# Patient Record
Sex: FEMALE | Race: WHITE | NOT HISPANIC OR LATINO | Employment: UNEMPLOYED | URBAN - METROPOLITAN AREA
[De-identification: names, ages, dates, MRNs, and addresses within clinical notes are randomized per-mention and may not be internally consistent; named-entity substitution may affect disease eponyms.]

---

## 2023-12-29 NOTE — PROGRESS NOTES
Subjective:     Encounter Date:01/02/2024    Primary Care Physician: Samuel Wei MD      Patient ID: Alayna Hannon is a 83 y.o. female.  Retired pediatric nurse.    Chief Complaint:Consult, Atrial Fibrillation, Aortic Stenosis, and Congestive Heart Failure    PROBLEM LIST:  Atrial fibrillation, paroxysmal  Previous ECVs  Ablation 4/2015  Loop recorder 2018  5/2023 echo EF 65%.  Mild to moderate aortic stenosis.  3/2022 normal MPS  Watchman implant  Hypertension  Aortic stenosis  Dyslipidemia  Chronic gastritis  GERD  History of melanoma with removal  Seborrheic keratosis  Surgeries:  Mohs procedure  Appendectomy  Partial hysterectomy  Breast reduction  Vitrectomy      Allergies   Allergen Reactions    Iodine Unknown - Low Severity    Sulfanilamide Unknown - Low Severity    Wound Dressing Adhesive Unknown - Low Severity         Current Outpatient Medications:     acetaminophen (TYLENOL) 650 MG 8 hr tablet, Take 1 tablet by mouth Every 8 (Eight) Hours As Needed for Mild Pain., Disp: , Rfl:     amLODIPine (NORVASC) 2.5 MG tablet, Take 1 tablet by mouth Daily., Disp: , Rfl:     aspirin 325 MG tablet, Take 1 tablet by mouth Daily., Disp: , Rfl:     atorvastatin (LIPITOR) 80 MG tablet, Take 1 tablet by mouth Every Night., Disp: , Rfl:     Cyanocobalamin (VITAMIN B 12 PO), Take 1 tablet by mouth Daily., Disp: , Rfl:     dicyclomine (BENTYL) 10 MG capsule, Take 1 capsule by mouth 4 (Four) Times a Day Before Meals & at Bedtime., Disp: , Rfl:     docusate sodium (COLACE) 250 MG capsule, Take 1 capsule by mouth Daily., Disp: , Rfl:     dofetilide (TIKOSYN) 250 MCG capsule, Take 1 capsule by mouth 2 (Two) Times a Day., Disp: , Rfl:     furosemide (LASIX) 20 MG tablet, Take 1 tablet by mouth Daily As Needed (edema)., Disp: , Rfl:     loratadine (Claritin) 10 MG tablet, Take 1 tablet by mouth Daily., Disp: , Rfl:     LORazepam (Ativan) 1 MG tablet, Take 1 tablet by mouth every night at bedtime., Disp: , Rfl:     metoprolol  succinate XL (TOPROL-XL) 25 MG 24 hr tablet, Take 1 tablet by mouth 2 (Two) Times a Day., Disp: , Rfl:     omeprazole (priLOSEC) 40 MG capsule, Take 1 capsule by mouth Daily., Disp: , Rfl:     Probiotic Product (PROBIOTIC BLEND PO), Take  by mouth., Disp: , Rfl:         History of Present Illness    Patient is an 83-year-old  female who presents today for establishment of cardiac care secondary to noted history of paroxysmal atrial fibrillation.  Patient was diagnosed with this many years ago.  She followed with cardiology in [facility] until she relocated to the area here in July.  Patient notes that her atrial fibrillation has been well-controlled post ablation with Tikosyn.  She is not on anticoagulation as she has a watchman implant.  Patient notes she is predominantly here to establish care.  She had an echocardiogram performed earlier this year and noted that her cardiologist there recommended repeat echocardiography in about 6 months which would be now.  She denies any chest pain, pressure.  Her blood pressure typically runs systolically in the 130s to 140s range.  She admits to some occasional palpitations but feels that these are PACs/PVCs.  Overall, is doing well.    The following portions of the patient's history were reviewed and updated as appropriate: allergies, current medications, past family history, past medical history, past social history, past surgical history and problem list.    Family History   Problem Relation Age of Onset    Arrhythmia Sister        Social History     Tobacco Use    Smoking status: Former     Types: Cigarettes   Substance Use Topics    Alcohol use: Yes     Comment: Occasional    Drug use: Never         Review of Systems   Constitutional: Negative for fever and malaise/fatigue.   HENT:  Negative for nosebleeds.    Eyes:  Negative for redness and visual disturbance.   Cardiovascular:  Positive for palpitations. Negative for orthopnea and paroxysmal nocturnal  "dyspnea.   Respiratory:  Negative for cough, snoring, sputum production and wheezing.    Hematologic/Lymphatic: Negative for bleeding problem.   Skin:  Negative for flushing, itching and rash.   Musculoskeletal:  Positive for arthritis. Negative for falls, joint pain and muscle cramps.   Gastrointestinal:  Negative for abdominal pain, diarrhea, heartburn, nausea and vomiting.   Genitourinary:  Negative for hematuria.   Neurological:  Negative for excessive daytime sleepiness, dizziness, headaches, tremors and weakness.   Psychiatric/Behavioral:  Negative for substance abuse. The patient is not nervous/anxious.           Objective:   /77   Pulse 67   Resp 20   Ht 175.3 cm (69\")   Wt 72.7 kg (160 lb 3.2 oz)   SpO2 100%   BMI 23.66 kg/m²         Vitals reviewed.   Constitutional:       Appearance: Healthy appearance. Well-developed and not in distress.   Eyes:      Conjunctiva/sclera: Conjunctivae normal.      Pupils: Pupils are equal, round, and reactive to light.   HENT:      Head: Normocephalic and atraumatic.    Mouth/Throat:      Pharynx: Oropharynx is clear.   Neck:      Thyroid: Thyroid normal. No thyromegaly.      Vascular: Normal carotid pulses. No carotid bruit or JVD. JVD normal.      Lymphadenopathy: No cervical adenopathy.   Pulmonary:      Effort: No respiratory distress.      Breath sounds: No wheezing. No rales.   Chest:      Chest wall: Not tender to palpatation.   Cardiovascular:      Normal rate. Regular rhythm.      Murmurs: There is a grade 3/6 systolic murmur at the URSB, ULSB and apex, radiating to the neck.      No gallop.    Pulses:     Carotid: 2+ bilaterally.     Dorsalis pedis: 2+ bilaterally.     Posterior tibial: 2+ bilaterally.  Edema:     Peripheral edema absent.   Abdominal:      General: There is no distension or abdominal bruit.      Palpations: There is no abdominal mass.      Tenderness: There is no abdominal tenderness. There is no rebound.   Musculoskeletal:         " General: No tenderness or deformity.      Extremities: No clubbing present.Skin:     General: Skin is warm and dry. There is no cyanosis.      Findings: No rash.   Neurological:      Mental Status: Alert, oriented to person, place, and time and oriented to person, place and time.           ECG 12 Lead    Date/Time: 1/2/2024 10:22 AM  Performed by: Gio Soares MD    Authorized by: Gio Soares MD  Comparison: not compared with previous ECG   Previous ECG: no previous ECG available  Rhythm: sinus rhythm and sinus arrhythmia                Assessment:   Assessment & Plan      Diagnoses and all orders for this visit:    1. Paroxysmal atrial fibrillation (Primary)  -     ECG 12 Lead      1.  Paroxysmal atrial fibrillation.  Maintaining sinus rhythm on Tikosyn.  Normal QTc today.  No anticoagulation but status post Watchman device.  2.  Hypertension controlled  3.  Aortic stenosis, moderate by most recent echo and exam today.  No definitive symptoms.  4.  Mild dyspnea on exertion, chronic and minimally changed.    Recommendations:  1.  Continue current medical therapy including Tikosyn  2.  Check echocardiogram prior to next visit  3.  Revisit 6 months apparent symptom change       Gaye GONZÁLES scribed portions of this dictation for Dr. Gio Soares.     Advance Care Planning   ACP discussion was held with the patient during this visit. Patient has an advance directive (not in EMR), copy requested.      Gio Soares MD    Dictated utilizing Dragon dictation

## 2024-01-02 ENCOUNTER — OFFICE VISIT (OUTPATIENT)
Dept: CARDIOLOGY | Facility: CLINIC | Age: 84
End: 2024-01-02
Payer: MEDICARE

## 2024-01-02 VITALS
OXYGEN SATURATION: 100 % | HEIGHT: 69 IN | DIASTOLIC BLOOD PRESSURE: 77 MMHG | HEART RATE: 67 BPM | RESPIRATION RATE: 20 BRPM | SYSTOLIC BLOOD PRESSURE: 147 MMHG | BODY MASS INDEX: 23.73 KG/M2 | WEIGHT: 160.2 LBS

## 2024-01-02 DIAGNOSIS — I48.0 PAROXYSMAL ATRIAL FIBRILLATION: Primary | ICD-10-CM

## 2024-01-02 PROCEDURE — 1160F RVW MEDS BY RX/DR IN RCRD: CPT | Performed by: INTERNAL MEDICINE

## 2024-01-02 PROCEDURE — 1159F MED LIST DOCD IN RCRD: CPT | Performed by: INTERNAL MEDICINE

## 2024-01-02 PROCEDURE — 99204 OFFICE O/P NEW MOD 45 MIN: CPT | Performed by: INTERNAL MEDICINE

## 2024-01-02 PROCEDURE — 93000 ELECTROCARDIOGRAM COMPLETE: CPT | Performed by: INTERNAL MEDICINE

## 2024-01-02 RX ORDER — DOCUSATE SODIUM 250 MG
250 CAPSULE ORAL DAILY
COMMUNITY

## 2024-01-02 RX ORDER — LORAZEPAM 1 MG/1
1 TABLET ORAL
COMMUNITY

## 2024-01-02 RX ORDER — ASPIRIN 325 MG
325 TABLET ORAL DAILY
COMMUNITY

## 2024-01-02 RX ORDER — METOPROLOL SUCCINATE 25 MG/1
25 TABLET, EXTENDED RELEASE ORAL 2 TIMES DAILY
COMMUNITY
Start: 2023-11-05

## 2024-01-02 RX ORDER — FUROSEMIDE 20 MG/1
20 TABLET ORAL DAILY PRN
COMMUNITY

## 2024-01-02 RX ORDER — OMEPRAZOLE 40 MG/1
40 CAPSULE, DELAYED RELEASE ORAL DAILY
COMMUNITY
Start: 2023-11-05

## 2024-01-02 RX ORDER — SENNOSIDES 8.6 MG
650 CAPSULE ORAL EVERY 8 HOURS PRN
COMMUNITY

## 2024-01-02 RX ORDER — AMLODIPINE BESYLATE 2.5 MG/1
2.5 TABLET ORAL DAILY
COMMUNITY
Start: 2023-11-05

## 2024-01-02 RX ORDER — DOFETILIDE 0.25 MG/1
250 CAPSULE ORAL 2 TIMES DAILY
COMMUNITY

## 2024-01-02 RX ORDER — LORATADINE 10 MG/1
10 TABLET ORAL DAILY
COMMUNITY

## 2024-01-02 RX ORDER — DICYCLOMINE HYDROCHLORIDE 10 MG/1
10 CAPSULE ORAL
COMMUNITY

## 2024-01-02 RX ORDER — ATORVASTATIN CALCIUM 80 MG/1
80 TABLET, FILM COATED ORAL NIGHTLY
COMMUNITY
Start: 2023-11-05

## 2024-07-01 NOTE — PROGRESS NOTES
Subjective:     Encounter Date:07/02/2024    Primary Care Physician: Samuel Wei MD      Patient ID: Alayna Hannon is a 84 y.o. female.    Chief Complaint:Follow-up (Had a echo this morning)    KAYLEE LIST:  Atrial fibrillation, paroxysmal  Previous ECVs  Ablation 4/2015  Loop recorder 2018 5/2023 echo EF 65%.  Mild to moderate aortic stenosis.  3/2022 normal MPS  Watchman implant  7/2/2024 echo EF 70%.  LVH noted.  Moderate AS.  Peak gradient 38 mmHg.  Mean gradient 21 mmHg.  Mild MR.  Hypertension  Aortic stenosis  Dyslipidemia  Chronic gastritis  GERD  History of melanoma with removal  Seborrheic keratosis  Surgeries:  Mohs procedure  Appendectomy  Partial hysterectomy  Breast reduction  Vitrectomy      Allergies   Allergen Reactions    Iodine Unknown - Low Severity    Sulfanilamide Unknown - Low Severity    Wound Dressing Adhesive Unknown - Low Severity         Current Outpatient Medications:     acetaminophen (TYLENOL) 650 MG 8 hr tablet, Take 1 tablet by mouth Every 8 (Eight) Hours As Needed for Mild Pain., Disp: , Rfl:     amLODIPine (NORVASC) 2.5 MG tablet, Take 1 tablet by mouth Daily., Disp: , Rfl:     aspirin 325 MG tablet, Take 1 tablet by mouth Daily., Disp: , Rfl:     atorvastatin (LIPITOR) 80 MG tablet, Take 1 tablet by mouth Every Night., Disp: , Rfl:     Cyanocobalamin (VITAMIN B 12 PO), Take 1 tablet by mouth Daily., Disp: , Rfl:     docusate sodium (COLACE) 250 MG capsule, Take 1 capsule by mouth Daily., Disp: , Rfl:     dofetilide (TIKOSYN) 250 MCG capsule, Take 1 capsule by mouth 2 (Two) Times a Day., Disp: , Rfl:     furosemide (LASIX) 20 MG tablet, Take 1 tablet by mouth Daily As Needed (edema)., Disp: , Rfl:     loratadine (Claritin) 10 MG tablet, Take 1 tablet by mouth Daily., Disp: , Rfl:     LORazepam (Ativan) 1 MG tablet, Take 1 tablet by mouth every night at bedtime., Disp: , Rfl:     metoprolol succinate XL (TOPROL-XL) 25 MG 24 hr tablet, Take 1 tablet by mouth 2 (Two) Times  "a Day., Disp: , Rfl:     omeprazole (priLOSEC) 40 MG capsule, Take 1 capsule by mouth Daily., Disp: , Rfl:     Probiotic Product (PROBIOTIC BLEND PO), Take  by mouth., Disp: , Rfl:     dicyclomine (BENTYL) 10 MG capsule, Take 1 capsule by mouth 4 (Four) Times a Day Before Meals & at Bedtime., Disp: , Rfl:         History of Present Illness    Patient is an 84-year-old  female who is being seen today for 6-month follow-up of paroxysmal atrial fibrillation and aortic stenosis patient had echocardiogram today which showed still moderate aortic stenosis.  Patient denies any chest pain, pressure.  Does endorse some shortness of breath at times but does not feel that this is lifestyle limiting.  She had a CT scan performed of her chest recently which suggested coronary calcifications.  She has questions regarding this.  She also questions whether or not her aspirin could be affecting her gastritis.  Follows blood pressure at home with controlled readings.    The following portions of the patient's history were reviewed and updated as appropriate: allergies, current medications, past family history, past medical history, past social history, past surgical history and problem list.      Social History     Tobacco Use    Smoking status: Former     Types: Cigarettes   Substance Use Topics    Alcohol use: Yes     Comment: Occasional    Drug use: Never         ROS       Objective:   /82   Pulse 62   Ht 175.3 cm (69\")   Wt 74.3 kg (163 lb 12.8 oz)   SpO2 96%   BMI 24.19 kg/m²         Vitals reviewed.   Constitutional:       Appearance: Well-developed and not in distress.   Neck:      Vascular: No JVD.      Trachea: No tracheal deviation.   Pulmonary:      Effort: Pulmonary effort is normal.      Breath sounds: Normal breath sounds.   Cardiovascular:      Normal rate. Regular rhythm.      Murmurs: There is a grade 3/6 systolic murmur.   Pulses:     Intact distal pulses.   Edema:     Peripheral edema absent. "   Musculoskeletal:         General: No deformity. Skin:     General: Skin is warm and dry.   Neurological:      Mental Status: Alert and oriented to person, place, and time.         Procedures          Assessment:   Assessment & Plan      Diagnoses and all orders for this visit:    1. Aortic stenosis, moderate (Primary), stable.  No evidence of heart failure.  Echocardiogram reviewed with patient in the office today.  She verbalized understanding.    2. Paroxysmal atrial fibrillation, stable.  Recent issues with tachypalpitations.  This has been helped with changing beta-blockade dosage time.  Previous Watchman.  On aspirin.    3. Dyslipidemia, stable.  Labs with primary care.  On statin.    4. Coronary artery disease involving native coronary artery of native heart without angina pectoris.  Recent CT scan with multivessel coronary calcifications.  No overt angina.  Normal myocardial perfusion study in 2022.      Plan:  Patient is overall stable from cardiac standpoint.  Decrease aspirin to 81 mg daily to see if this helps with some of her gastritis.  Continue other current cardiac medications.  Discussed the possibility of myocardial perfusion study given multivessel coronary calcifications.  At this time patient does not feel that her symptoms warrant this.  She will call us back if this changes.  Follow-up in 6 months time with repeat echo or sooner if needed.                 Dictated utilizing Dragon dictation

## 2024-07-02 ENCOUNTER — HOSPITAL ENCOUNTER (OUTPATIENT)
Dept: CARDIOLOGY | Facility: HOSPITAL | Age: 84
Discharge: HOME | End: 2024-07-02
Admitting: INTERNAL MEDICINE
Payer: MEDICARE

## 2024-07-02 ENCOUNTER — OFFICE VISIT (OUTPATIENT)
Dept: CARDIOLOGY | Facility: CLINIC | Age: 84
End: 2024-07-02
Payer: MEDICARE

## 2024-07-02 VITALS — WEIGHT: 160 LBS | HEIGHT: 69 IN | BODY MASS INDEX: 23.7 KG/M2

## 2024-07-02 VITALS
HEIGHT: 69 IN | BODY MASS INDEX: 24.26 KG/M2 | HEART RATE: 62 BPM | DIASTOLIC BLOOD PRESSURE: 82 MMHG | SYSTOLIC BLOOD PRESSURE: 159 MMHG | WEIGHT: 163.8 LBS | OXYGEN SATURATION: 96 %

## 2024-07-02 DIAGNOSIS — I48.0 PAROXYSMAL ATRIAL FIBRILLATION: ICD-10-CM

## 2024-07-02 DIAGNOSIS — I25.10 CORONARY ARTERY DISEASE INVOLVING NATIVE CORONARY ARTERY OF NATIVE HEART WITHOUT ANGINA PECTORIS: ICD-10-CM

## 2024-07-02 DIAGNOSIS — I35.0 AORTIC STENOSIS, MODERATE: Primary | ICD-10-CM

## 2024-07-02 DIAGNOSIS — E78.5 DYSLIPIDEMIA: ICD-10-CM

## 2024-07-02 LAB
ASCENDING AORTA: 3 CM
BH CV ECHO MEAS - AO MAX PG: 38 MMHG
BH CV ECHO MEAS - AO MEAN PG: 21 MMHG
BH CV ECHO MEAS - AO ROOT DIAM: 2.9 CM
BH CV ECHO MEAS - AO V2 MAX: 306 CM/SEC
BH CV ECHO MEAS - AO V2 VTI: 82.9 CM
BH CV ECHO MEAS - AVA(I,D): 0.8 CM2
BH CV ECHO MEAS - EDV(CUBED): 68 ML
BH CV ECHO MEAS - EDV(MOD-SP2): 58.3 ML
BH CV ECHO MEAS - EDV(MOD-SP4): 60.6 ML
BH CV ECHO MEAS - EF(MOD-BP): 84.5 %
BH CV ECHO MEAS - EF(MOD-SP2): 81.6 %
BH CV ECHO MEAS - EF(MOD-SP4): 85.7 %
BH CV ECHO MEAS - ESV(CUBED): 14.7 ML
BH CV ECHO MEAS - ESV(MOD-SP2): 10.7 ML
BH CV ECHO MEAS - ESV(MOD-SP4): 8.6 ML
BH CV ECHO MEAS - FS: 40 %
BH CV ECHO MEAS - IVS/LVPW: 0.69 CM
BH CV ECHO MEAS - IVSD: 0.86 CM
BH CV ECHO MEAS - LA DIMENSION: 4.1 CM
BH CV ECHO MEAS - LAT PEAK E' VEL: 12.4 CM/SEC
BH CV ECHO MEAS - LV DIASTOLIC VOL/BSA (35-75): 32.3 CM2
BH CV ECHO MEAS - LV MASS(C)D: 142.3 GRAMS
BH CV ECHO MEAS - LV MAX PG: 2.46 MMHG
BH CV ECHO MEAS - LV MEAN PG: 1.5 MMHG
BH CV ECHO MEAS - LV SYSTOLIC VOL/BSA (12-30): 4.6 CM2
BH CV ECHO MEAS - LV V1 MAX: 78.4 CM/SEC
BH CV ECHO MEAS - LV V1 VTI: 20.9 CM
BH CV ECHO MEAS - LVIDD: 4.1 CM
BH CV ECHO MEAS - LVIDS: 2.45 CM
BH CV ECHO MEAS - LVOT AREA: 3.3 CM2
BH CV ECHO MEAS - LVOT DIAM: 2.05 CM
BH CV ECHO MEAS - LVPWD: 1.25 CM
BH CV ECHO MEAS - MED PEAK E' VEL: 8.6 CM/SEC
BH CV ECHO MEAS - MV A MAX VEL: 39.4 CM/SEC
BH CV ECHO MEAS - MV DEC SLOPE: 534.3 CM/SEC2
BH CV ECHO MEAS - MV DEC TIME: 0.29 SEC
BH CV ECHO MEAS - MV E MAX VEL: 108 CM/SEC
BH CV ECHO MEAS - MV E/A: 2.7
BH CV ECHO MEAS - MV P1/2T: 72.6 MSEC
BH CV ECHO MEAS - MVA(P1/2T): 3 CM2
BH CV ECHO MEAS - PA ACC TIME: 0.1 SEC
BH CV ECHO MEAS - PA V2 MAX: 105.2 CM/SEC
BH CV ECHO MEAS - PAPD(PI EDV): 6 MMHG
BH CV ECHO MEAS - PI END-D VEL: 117.6 CM/SEC
BH CV ECHO MEAS - RAP SYSTOLE: 8 MMHG
BH CV ECHO MEAS - RVSP: 33 MMHG
BH CV ECHO MEAS - SV(LVOT): 69 ML
BH CV ECHO MEAS - SV(MOD-SP2): 47.6 ML
BH CV ECHO MEAS - SV(MOD-SP4): 52 ML
BH CV ECHO MEAS - SVI(LVOT): 36.7 ML/M2
BH CV ECHO MEAS - SVI(MOD-SP2): 25.3 ML/M2
BH CV ECHO MEAS - SVI(MOD-SP4): 27.7 ML/M2
BH CV ECHO MEAS - TAPSE (>1.6): 2.6 CM
BH CV ECHO MEAS - TR MAX PG: 25 MMHG
BH CV ECHO MEAS - TR MAX VEL: 249.4 CM/SEC
BH CV ECHO MEASUREMENTS AVERAGE E/E' RATIO: 10.29
BH CV VAS BP LEFT ARM: NORMAL MMHG
BH CV XLRA - RV BASE: 4.2 CM
BH CV XLRA - RV LENGTH: 7 CM
BH CV XLRA - RV MID: 3.2 CM
BH CV XLRA - TDI S': 11.7 CM/SEC
IVRT: 72 MS
LEFT ATRIUM VOLUME INDEX: 49.1 ML/M2
LV EF 2D ECHO EST: 84 %

## 2024-07-02 PROCEDURE — 1160F RVW MEDS BY RX/DR IN RCRD: CPT | Performed by: NURSE PRACTITIONER

## 2024-07-02 PROCEDURE — 99214 OFFICE O/P EST MOD 30 MIN: CPT | Performed by: NURSE PRACTITIONER

## 2024-07-02 PROCEDURE — 93306 TTE W/DOPPLER COMPLETE: CPT

## 2024-07-02 PROCEDURE — 1159F MED LIST DOCD IN RCRD: CPT | Performed by: NURSE PRACTITIONER

## 2024-07-02 NOTE — LETTER
July 2, 2024       No Recipients    Patient: Alayna Hannon   YOB: 1940   Date of Visit: 7/2/2024     Dear Samuel Wei MD:       Thank you for referring Alayna Hannon to me for evaluation. Below are the relevant portions of my assessment and plan of care.    If you have questions, please do not hesitate to call me. I look forward to following Alayna along with you.         Sincerely,        NIVIA Goyal        CC:   No Recipients    Gaye Jordan APRN  07/02/24 [id number]  Sign when Signing Visit  Subjective:     Encounter Date:07/02/2024    Primary Care Physician: Samuel Wei MD      Patient ID: Alayna Hannon is a 84 y.o. female.    Chief Complaint:Follow-up (Had a echo this morning)    CONSUELOGAL LIST:  Atrial fibrillation, paroxysmal  Previous ECVs  Ablation 4/2015  Loop recorder 2018 5/2023 echo EF 65%.  Mild to moderate aortic stenosis.  3/2022 normal MPS  Watchman implant  7/2/2024 echo EF 70%.  LVH noted.  Moderate AS.  Peak gradient 38 mmHg.  Mean gradient 21 mmHg.  Mild MR.  Hypertension  Aortic stenosis  Dyslipidemia  Chronic gastritis  GERD  History of melanoma with removal  Seborrheic keratosis  Surgeries:  Mohs procedure  Appendectomy  Partial hysterectomy  Breast reduction  Vitrectomy      Allergies   Allergen Reactions   • Iodine Unknown - Low Severity   • Sulfanilamide Unknown - Low Severity   • Wound Dressing Adhesive Unknown - Low Severity         Current Outpatient Medications:   •  acetaminophen (TYLENOL) 650 MG 8 hr tablet, Take 1 tablet by mouth Every 8 (Eight) Hours As Needed for Mild Pain., Disp: , Rfl:   •  amLODIPine (NORVASC) 2.5 MG tablet, Take 1 tablet by mouth Daily., Disp: , Rfl:   •  aspirin 325 MG tablet, Take 1 tablet by mouth Daily., Disp: , Rfl:   •  atorvastatin (LIPITOR) 80 MG tablet, Take 1 tablet by mouth Every Night., Disp: , Rfl:   •  Cyanocobalamin (VITAMIN B 12 PO), Take 1 tablet by mouth Daily., Disp: , Rfl:   •  docusate sodium (COLACE)  250 MG capsule, Take 1 capsule by mouth Daily., Disp: , Rfl:   •  dofetilide (TIKOSYN) 250 MCG capsule, Take 1 capsule by mouth 2 (Two) Times a Day., Disp: , Rfl:   •  furosemide (LASIX) 20 MG tablet, Take 1 tablet by mouth Daily As Needed (edema)., Disp: , Rfl:   •  loratadine (Claritin) 10 MG tablet, Take 1 tablet by mouth Daily., Disp: , Rfl:   •  LORazepam (Ativan) 1 MG tablet, Take 1 tablet by mouth every night at bedtime., Disp: , Rfl:   •  metoprolol succinate XL (TOPROL-XL) 25 MG 24 hr tablet, Take 1 tablet by mouth 2 (Two) Times a Day., Disp: , Rfl:   •  omeprazole (priLOSEC) 40 MG capsule, Take 1 capsule by mouth Daily., Disp: , Rfl:   •  Probiotic Product (PROBIOTIC BLEND PO), Take  by mouth., Disp: , Rfl:   •  dicyclomine (BENTYL) 10 MG capsule, Take 1 capsule by mouth 4 (Four) Times a Day Before Meals & at Bedtime., Disp: , Rfl:         History of Present Illness    Patient is an 84-year-old  female who is being seen today for 6-month follow-up of paroxysmal atrial fibrillation and aortic stenosis patient had echocardiogram today which showed still moderate aortic stenosis.  Patient denies any chest pain, pressure.  Does endorse some shortness of breath at times but does not feel that this is lifestyle limiting.  She had a CT scan performed of her chest recently which suggested coronary calcifications.  She has questions regarding this.  She also questions whether or not her aspirin could be affecting her gastritis.  Follows blood pressure at home with controlled readings.    The following portions of the patient's history were reviewed and updated as appropriate: allergies, current medications, past family history, past medical history, past social history, past surgical history and problem list.      Social History     Tobacco Use   • Smoking status: Former     Types: Cigarettes   Substance Use Topics   • Alcohol use: Yes     Comment: Occasional   • Drug use: Never         ROS      "  Objective:   /82   Pulse 62   Ht 175.3 cm (69\")   Wt 74.3 kg (163 lb 12.8 oz)   SpO2 96%   BMI 24.19 kg/m²         Vitals reviewed.   Constitutional:       Appearance: Well-developed and not in distress.   Neck:      Vascular: No JVD.      Trachea: No tracheal deviation.   Pulmonary:      Effort: Pulmonary effort is normal.      Breath sounds: Normal breath sounds.   Cardiovascular:      Normal rate. Regular rhythm.      Murmurs: There is a grade 3/6 systolic murmur.   Pulses:     Intact distal pulses.   Edema:     Peripheral edema absent.   Musculoskeletal:         General: No deformity. Skin:     General: Skin is warm and dry.   Neurological:      Mental Status: Alert and oriented to person, place, and time.         Procedures          Assessment:   Assessment & Plan     Diagnoses and all orders for this visit:    1. Aortic stenosis, moderate (Primary), stable.  No evidence of heart failure.  Echocardiogram reviewed with patient in the office today.  She verbalized understanding.    2. Paroxysmal atrial fibrillation, stable.  Recent issues with tachypalpitations.  This has been helped with changing beta-blockade dosage time.  Previous Watchman.  On aspirin.    3. Dyslipidemia, stable.  Labs with primary care.  On statin.    4. Coronary artery disease involving native coronary artery of native heart without angina pectoris.  Recent CT scan with multivessel coronary calcifications.  No overt angina.  Normal myocardial perfusion study in 2022.      Plan:  Patient is overall stable from cardiac standpoint.  Decrease aspirin to 81 mg daily to see if this helps with some of her gastritis.  Continue other current cardiac medications.  Discussed the possibility of myocardial perfusion study given multivessel coronary calcifications.  At this time patient does not feel that her symptoms warrant this.  She will call us back if this changes.  Follow-up in 6 months time with repeat echo or sooner if needed.   "               Dictated utilizing Dragon dictation

## 2025-03-11 ENCOUNTER — HOSPITAL ENCOUNTER (OUTPATIENT)
Dept: CARDIOLOGY | Facility: HOSPITAL | Age: 85
Discharge: HOME | End: 2025-03-11
Admitting: NURSE PRACTITIONER
Payer: MEDICARE

## 2025-03-11 DIAGNOSIS — I35.0 AORTIC STENOSIS, MODERATE: ICD-10-CM

## 2025-03-11 LAB
AV MEAN PRESS GRAD SYS DOP V1V2: 28 MMHG
AV VMAX SYS DOP: 345 CM/SEC
BH CV ECHO MEAS - AO MAX PG: 46 MMHG
BH CV ECHO MEAS - AO ROOT DIAM: 3 CM
BH CV ECHO MEAS - AO V2 VTI: 89.8 CM
BH CV ECHO MEAS - AVA(I,D): 0.72 CM2
BH CV ECHO MEAS - EDV(CUBED): 78.6 ML
BH CV ECHO MEAS - EDV(MOD-SP2): 114 ML
BH CV ECHO MEAS - EDV(MOD-SP4): 92 ML
BH CV ECHO MEAS - EF(MOD-SP2): 72.2 %
BH CV ECHO MEAS - EF(MOD-SP4): 59.2 %
BH CV ECHO MEAS - ESV(CUBED): 20 ML
BH CV ECHO MEAS - ESV(MOD-SP2): 31.7 ML
BH CV ECHO MEAS - ESV(MOD-SP4): 37.5 ML
BH CV ECHO MEAS - FS: 36.6 %
BH CV ECHO MEAS - IVS/LVPW: 1.04 CM
BH CV ECHO MEAS - IVSD: 1.35 CM
BH CV ECHO MEAS - LA DIMENSION: 4.1 CM
BH CV ECHO MEAS - LAT PEAK E' VEL: 10.4 CM/SEC
BH CV ECHO MEAS - LV DIASTOLIC VOL/BSA (35-75): 48.6 CM2
BH CV ECHO MEAS - LV MASS(C)D: 213.7 GRAMS
BH CV ECHO MEAS - LV MAX PG: 3.3 MMHG
BH CV ECHO MEAS - LV MEAN PG: 1.82 MMHG
BH CV ECHO MEAS - LV SYSTOLIC VOL/BSA (12-30): 19.8 CM2
BH CV ECHO MEAS - LV V1 MAX: 91.3 CM/SEC
BH CV ECHO MEAS - LV V1 VTI: 25.6 CM
BH CV ECHO MEAS - LVIDD: 4.3 CM
BH CV ECHO MEAS - LVIDS: 2.7 CM
BH CV ECHO MEAS - LVOT AREA: 2.5 CM2
BH CV ECHO MEAS - LVOT DIAM: 1.8 CM
BH CV ECHO MEAS - LVPWD: 1.31 CM
BH CV ECHO MEAS - MED PEAK E' VEL: 6.5 CM/SEC
BH CV ECHO MEAS - MV A MAX VEL: 55.3 CM/SEC
BH CV ECHO MEAS - MV DEC SLOPE: 865.7 CM/SEC2
BH CV ECHO MEAS - MV E MAX VEL: 109 CM/SEC
BH CV ECHO MEAS - MV E/A: 1.97
BH CV ECHO MEAS - MV MAX PG: 6.5 MMHG
BH CV ECHO MEAS - MV MEAN PG: 1.42 MMHG
BH CV ECHO MEAS - MV P1/2T: 44.5 MSEC
BH CV ECHO MEAS - MV V2 VTI: 32 CM
BH CV ECHO MEAS - MVA(P1/2T): 4.9 CM2
BH CV ECHO MEAS - MVA(VTI): 2.03 CM2
BH CV ECHO MEAS - PA ACC TIME: 0.12 SEC
BH CV ECHO MEAS - RAP SYSTOLE: 8 MMHG
BH CV ECHO MEAS - RVSP: 31 MMHG
BH CV ECHO MEAS - SV(LVOT): 65 ML
BH CV ECHO MEAS - SV(MOD-SP2): 82.3 ML
BH CV ECHO MEAS - SV(MOD-SP4): 54.5 ML
BH CV ECHO MEAS - SVI(LVOT): 34.3 ML/M2
BH CV ECHO MEAS - SVI(MOD-SP2): 43.5 ML/M2
BH CV ECHO MEAS - SVI(MOD-SP4): 28.8 ML/M2
BH CV ECHO MEAS - TAPSE (>1.6): 2.43 CM
BH CV ECHO MEAS - TR MAX PG: 23 MMHG
BH CV ECHO MEAS - TR MAX VEL: 238 CM/SEC
BH CV ECHO MEASUREMENTS AVERAGE E/E' RATIO: 12.9
BH CV XLRA - RV BASE: 4 CM
BH CV XLRA - RV LENGTH: 6.3 CM
BH CV XLRA - RV MID: 3.1 CM
BH CV XLRA - TDI S': 10.4 CM/SEC
LEFT ATRIUM VOLUME INDEX: 37.8 ML/M2
LV EF 2D ECHO EST: 70 %
LV EF BIPLANE MOD: 67.4 %

## 2025-03-11 PROCEDURE — 93306 TTE W/DOPPLER COMPLETE: CPT

## 2025-03-11 NOTE — PROGRESS NOTES
Subjective:     Encounter Date:03/12/2025    Primary Care Physician: Samuel Wei MD      Patient ID: Alayna Hannon is a 85 y.o. female.    Chief Complaint:Follow-up (6 month )    PROBLEM LIST:  Atrial fibrillation, paroxysmal  Previous ECVs  Ablation 4/2015  Loop recorder 2018 5/2023 echo EF 65%.  Mild to moderate aortic stenosis.  3/2022 normal MPS  Watchman implant  7/2/2024 echo EF 70%.  LVH noted.  Moderate AS.  Peak gradient 38 mmHg.  Mean gradient 21 mmHg.  Mild MR.  3/2025 echo EF 70%.  Moderate LVH.  Moderate aortic stenosis.  Mean pressure gradient 28 mmHg.  Mild MR.  Hypertension  Aortic stenosis  Dyslipidemia  Chronic gastritis  GERD  History of melanoma with removal  Seborrheic keratosis  Surgeries:  Mohs procedure  Appendectomy  Partial hysterectomy  Breast reduction  Vitrectomy      Allergies   Allergen Reactions    Iodine Unknown - Low Severity    Sulfanilamide Unknown - Low Severity    Wound Dressing Adhesive Unknown - Low Severity         Current Outpatient Medications:     acetaminophen (TYLENOL) 650 MG 8 hr tablet, Take 1 tablet by mouth Every 8 (Eight) Hours As Needed for Mild Pain., Disp: , Rfl:     amLODIPine (NORVASC) 2.5 MG tablet, Take 1 tablet by mouth Daily., Disp: , Rfl:     atorvastatin (LIPITOR) 80 MG tablet, Take 1 tablet by mouth Every Night., Disp: , Rfl:     bisoprolol (ZEBeta) 5 MG tablet, , Disp: , Rfl:     Cyanocobalamin (VITAMIN B 12 PO), Take 1 tablet by mouth Daily., Disp: , Rfl:     dicyclomine (BENTYL) 10 MG capsule, Take 1 capsule by mouth 4 (Four) Times a Day Before Meals & at Bedtime., Disp: , Rfl:     docusate sodium (COLACE) 250 MG capsule, Take 1 capsule by mouth Daily., Disp: , Rfl:     dofetilide (TIKOSYN) 250 MCG capsule, Take 1 capsule by mouth 2 (Two) Times a Day., Disp: , Rfl:     furosemide (LASIX) 20 MG tablet, Take 1 tablet by mouth Daily As Needed (edema)., Disp: , Rfl:     loratadine (Claritin) 10 MG tablet, Take 1 tablet by mouth Daily., Disp: ,  "Rfl:     LORazepam (Ativan) 1 MG tablet, Take 1 tablet by mouth every night at bedtime., Disp: , Rfl:     losartan (COZAAR) 25 MG tablet, As Needed (if systolic BP is over 140)., Disp: , Rfl:     omeprazole (priLOSEC) 40 MG capsule, Take 1 capsule by mouth Daily., Disp: , Rfl:     Probiotic Product (PROBIOTIC BLEND PO), Take  by mouth., Disp: , Rfl:     metoprolol succinate XL (TOPROL-XL) 25 MG 24 hr tablet, Take 1 tablet by mouth 2 (Two) Times a Day. (Patient not taking: Reported on 3/12/2025), Disp: , Rfl:         History of Present Illness    Patient is an 85-year-old  female who we are seeing today for 6-month follow-up of aortic stenosis and paroxysmal atrial fibrillation.  Over the course of the last couple of months she has been experiencing a diffuse rash.  She has been following with her primary care physician.  She is changed multiple things to see if this would help with her rash.  She has now briefly holding medications intermittently.  She is scheduled to see a dermatologist in April.  She has chronic shortness of breath with exertion which she does not think is overall significantly changed.  No reported syncope, presyncope.  Does have some intermittent palpitations/atrial fibrillation but these are overall very brief in nature and not bothersome.  Her primary care physician discontinued her aspirin.    The following portions of the patient's history were reviewed and updated as appropriate: allergies, current medications, past family history, past medical history, past social history, past surgical history and problem list.      Social History     Tobacco Use    Smoking status: Former     Types: Cigarettes   Substance Use Topics    Alcohol use: Yes     Comment: Occasional    Drug use: Never         ROS       Objective:   /70   Pulse 56   Ht 175.3 cm (69\")   Wt 76.3 kg (168 lb 3.2 oz)   BMI 24.84 kg/m²         Vitals reviewed.   Constitutional:       Appearance: Well-developed and " not in distress.   Neck:      Vascular: No JVD.      Trachea: No tracheal deviation.   Pulmonary:      Effort: Pulmonary effort is normal.      Breath sounds: Normal breath sounds.   Cardiovascular:      Normal rate. Regular rhythm.      Murmurs: There is a grade 3/6 systolic murmur.   Edema:     Peripheral edema absent.   Musculoskeletal:         General: No deformity. Skin:     General: Skin is warm and dry.   Neurological:      Mental Status: Alert and oriented to person, place, and time.           ECG 12 Lead    Date/Time: 3/12/2025 2:00 PM  Performed by: Gaye Jordan APRN    Authorized by: Gaye Jordan APRN  Comparison: compared with previous ECG from 1/2/2024  Similar to previous ECG  Rhythm: sinus rhythm  Comments: Acceptable QTc                Assessment:   Assessment & Plan      Diagnoses and all orders for this visit:    1. Aortic stenosis, moderate (Primary), stable.  Echocardiogram performed this week.    2. Paroxysmal atrial fibrillation, stable.  On Tikosyn.  EKG acceptable today.    3. Coronary artery disease involving native coronary artery of native heart without angina pectoris, stable dyspnea on exertion.  Patient still wishes to defer ischemic evaluation.  On statin.      Plan:  Discussed with patient given her rash she may briefly hold amlodipine to see if this helps.  Reviewed echocardiogram and EKG results with patient in the office today.  Agree with discontinuation of aspirin.  Continue other current cardiac medications.  Follow-up in 6 months time with an EKG or sooner if needed.       Gaye GONZÁLES     Advance Care Planning   ACP discussion was held with the patient during this visit. Patient has an advance directive (not in EMR), copy requested.        Dictated utilizing Dragon dictation

## 2025-03-12 ENCOUNTER — OFFICE VISIT (OUTPATIENT)
Dept: CARDIOLOGY | Facility: CLINIC | Age: 85
End: 2025-03-12
Payer: MEDICARE

## 2025-03-12 VITALS
HEART RATE: 56 BPM | HEIGHT: 69 IN | WEIGHT: 168.2 LBS | BODY MASS INDEX: 24.91 KG/M2 | DIASTOLIC BLOOD PRESSURE: 70 MMHG | SYSTOLIC BLOOD PRESSURE: 162 MMHG

## 2025-03-12 DIAGNOSIS — I25.10 CORONARY ARTERY DISEASE INVOLVING NATIVE CORONARY ARTERY OF NATIVE HEART WITHOUT ANGINA PECTORIS: ICD-10-CM

## 2025-03-12 DIAGNOSIS — I48.0 PAROXYSMAL ATRIAL FIBRILLATION: ICD-10-CM

## 2025-03-12 DIAGNOSIS — I35.0 AORTIC STENOSIS, MODERATE: Primary | ICD-10-CM

## 2025-03-12 PROCEDURE — 1160F RVW MEDS BY RX/DR IN RCRD: CPT | Performed by: NURSE PRACTITIONER

## 2025-03-12 PROCEDURE — 93000 ELECTROCARDIOGRAM COMPLETE: CPT | Performed by: NURSE PRACTITIONER

## 2025-03-12 PROCEDURE — 99214 OFFICE O/P EST MOD 30 MIN: CPT | Performed by: NURSE PRACTITIONER

## 2025-03-12 PROCEDURE — 1159F MED LIST DOCD IN RCRD: CPT | Performed by: NURSE PRACTITIONER

## 2025-03-12 RX ORDER — LOSARTAN POTASSIUM 25 MG/1
TABLET ORAL AS NEEDED
COMMUNITY

## 2025-03-12 RX ORDER — BISOPROLOL FUMARATE 5 MG/1
TABLET, FILM COATED ORAL
COMMUNITY

## 2025-03-12 NOTE — LETTER
March 12, 2025     Samuel Wei MD  [address]  [place]  [place] [address]    Patient: Alayna Hannon   YOB: 1940   Date of Visit: 3/12/2025     Dear Samuel Wei MD:       Thank you for referring Alayna Hannon to me for evaluation. Below are the relevant portions of my assessment and plan of care.    If you have questions, please do not hesitate to call me. I look forward to following Alayna along with you.         Sincerely,        NIVIA Goyal        CC: No Recipients    Gaye Jordan APRN  03/12/25 1402  Sign when Signing Visit  Subjective:     Encounter Date:03/12/2025    Primary Care Physician: Samuel Wei MD      Patient ID: Alayna Hannon is a 85 y.o. female.    Chief Complaint:Follow-up (6 month )    PROBLEM LIST:  Atrial fibrillation, paroxysmal  Previous ECVs  Ablation 4/2015  Loop recorder 2018 5/2023 echo EF 65%.  Mild to moderate aortic stenosis.  3/2022 normal MPS  Watchman implant  7/2/2024 echo EF 70%.  LVH noted.  Moderate AS.  Peak gradient 38 mmHg.  Mean gradient 21 mmHg.  Mild MR.  3/2025 echo EF 70%.  Moderate LVH.  Moderate aortic stenosis.  Mean pressure gradient 28 mmHg.  Mild MR.  Hypertension  Aortic stenosis  Dyslipidemia  Chronic gastritis  GERD  History of melanoma with removal  Seborrheic keratosis  Surgeries:  Mohs procedure  Appendectomy  Partial hysterectomy  Breast reduction  Vitrectomy      Allergies   Allergen Reactions   • Iodine Unknown - Low Severity   • Sulfanilamide Unknown - Low Severity   • Wound Dressing Adhesive Unknown - Low Severity         Current Outpatient Medications:   •  acetaminophen (TYLENOL) 650 MG 8 hr tablet, Take 1 tablet by mouth Every 8 (Eight) Hours As Needed for Mild Pain., Disp: , Rfl:   •  amLODIPine (NORVASC) 2.5 MG tablet, Take 1 tablet by mouth Daily., Disp: , Rfl:   •  atorvastatin (LIPITOR) 80 MG tablet, Take 1 tablet by mouth Every Night., Disp: , Rfl:   •  bisoprolol (ZEBeta) 5 MG tablet, , Disp:  , Rfl:   •  Cyanocobalamin (VITAMIN B 12 PO), Take 1 tablet by mouth Daily., Disp: , Rfl:   •  dicyclomine (BENTYL) 10 MG capsule, Take 1 capsule by mouth 4 (Four) Times a Day Before Meals & at Bedtime., Disp: , Rfl:   •  docusate sodium (COLACE) 250 MG capsule, Take 1 capsule by mouth Daily., Disp: , Rfl:   •  dofetilide (TIKOSYN) 250 MCG capsule, Take 1 capsule by mouth 2 (Two) Times a Day., Disp: , Rfl:   •  furosemide (LASIX) 20 MG tablet, Take 1 tablet by mouth Daily As Needed (edema)., Disp: , Rfl:   •  loratadine (Claritin) 10 MG tablet, Take 1 tablet by mouth Daily., Disp: , Rfl:   •  LORazepam (Ativan) 1 MG tablet, Take 1 tablet by mouth every night at bedtime., Disp: , Rfl:   •  losartan (COZAAR) 25 MG tablet, As Needed (if systolic BP is over 140)., Disp: , Rfl:   •  omeprazole (priLOSEC) 40 MG capsule, Take 1 capsule by mouth Daily., Disp: , Rfl:   •  Probiotic Product (PROBIOTIC BLEND PO), Take  by mouth., Disp: , Rfl:   •  metoprolol succinate XL (TOPROL-XL) 25 MG 24 hr tablet, Take 1 tablet by mouth 2 (Two) Times a Day. (Patient not taking: Reported on 3/12/2025), Disp: , Rfl:         History of Present Illness    Patient is an 85-year-old  female who we are seeing today for 6-month follow-up of aortic stenosis and paroxysmal atrial fibrillation.  Over the course of the last couple of months she has been experiencing a diffuse rash.  She has been following with her primary care physician.  She is changed multiple things to see if this would help with her rash.  She has now briefly holding medications intermittently.  She is scheduled to see a dermatologist in April.  She has chronic shortness of breath with exertion which she does not think is overall significantly changed.  No reported syncope, presyncope.  Does have some intermittent palpitations/atrial fibrillation but these are overall very brief in nature and not bothersome.  Her primary care physician discontinued her aspirin.    The  "following portions of the patient's history were reviewed and updated as appropriate: allergies, current medications, past family history, past medical history, past social history, past surgical history and problem list.      Social History     Tobacco Use   • Smoking status: Former     Types: Cigarettes   Substance Use Topics   • Alcohol use: Yes     Comment: Occasional   • Drug use: Never         ROS       Objective:   /70   Pulse 56   Ht 175.3 cm (69\")   Wt 76.3 kg (168 lb 3.2 oz)   BMI 24.84 kg/m²         Vitals reviewed.   Constitutional:       Appearance: Well-developed and not in distress.   Neck:      Vascular: No JVD.      Trachea: No tracheal deviation.   Pulmonary:      Effort: Pulmonary effort is normal.      Breath sounds: Normal breath sounds.   Cardiovascular:      Normal rate. Regular rhythm.      Murmurs: There is a grade 3/6 systolic murmur.   Edema:     Peripheral edema absent.   Musculoskeletal:         General: No deformity. Skin:     General: Skin is warm and dry.   Neurological:      Mental Status: Alert and oriented to person, place, and time.           ECG 12 Lead    Date/Time: 3/12/2025 2:00 PM  Performed by: Gaye Jordan APRN    Authorized by: Gaye Jordan APRN  Comparison: compared with previous ECG from 1/2/2024  Similar to previous ECG  Rhythm: sinus rhythm  Comments: Acceptable QTc                Assessment:   Assessment & Plan     Diagnoses and all orders for this visit:    1. Aortic stenosis, moderate (Primary), stable.  Echocardiogram performed this week.    2. Paroxysmal atrial fibrillation, stable.  On Tikosyn.  EKG acceptable today.    3. Coronary artery disease involving native coronary artery of native heart without angina pectoris, stable dyspnea on exertion.  Patient still wishes to defer ischemic evaluation.  On statin.      Plan:  Discussed with patient given her rash she may briefly hold amlodipine to see if this helps.  Reviewed echocardiogram and " EKG results with patient in the office today.  Agree with discontinuation of aspirin.  Continue other current cardiac medications.  Follow-up in 6 months time with an EKG or sooner if needed.       Gaye GONZÁLES     Advance Care Planning  ACP discussion was held with the patient during this visit. Patient has an advance directive (not in EMR), copy requested.        Dictated utilizing Dragon dictation